# Patient Record
Sex: FEMALE | Race: BLACK OR AFRICAN AMERICAN | NOT HISPANIC OR LATINO | URBAN - METROPOLITAN AREA
[De-identification: names, ages, dates, MRNs, and addresses within clinical notes are randomized per-mention and may not be internally consistent; named-entity substitution may affect disease eponyms.]

---

## 2017-03-10 ENCOUNTER — INPATIENT (INPATIENT)
Facility: HOSPITAL | Age: 82
LOS: 5 days | Discharge: NURSING, REHAB OR LONG-TERM CARE | End: 2017-03-16
Attending: INTERNAL MEDICINE | Admitting: INTERNAL MEDICINE
Payer: MEDICARE

## 2017-03-10 VITALS
HEART RATE: 64 BPM | HEIGHT: 60 IN | WEIGHT: 110.01 LBS | TEMPERATURE: 98 F | SYSTOLIC BLOOD PRESSURE: 163 MMHG | DIASTOLIC BLOOD PRESSURE: 82 MMHG | RESPIRATION RATE: 18 BRPM | OXYGEN SATURATION: 99 %

## 2017-03-10 DIAGNOSIS — I10 ESSENTIAL (PRIMARY) HYPERTENSION: ICD-10-CM

## 2017-03-10 DIAGNOSIS — R41.82 ALTERED MENTAL STATUS, UNSPECIFIED: ICD-10-CM

## 2017-03-10 DIAGNOSIS — I21.4 NON-ST ELEVATION (NSTEMI) MYOCARDIAL INFARCTION: ICD-10-CM

## 2017-03-10 LAB
ALBUMIN SERPL ELPH-MCNC: 3.3 G/DL — SIGNIFICANT CHANGE UP (ref 3.3–5)
ALP SERPL-CCNC: 116 U/L — SIGNIFICANT CHANGE UP (ref 40–120)
ALT FLD-CCNC: 25 U/L — SIGNIFICANT CHANGE UP (ref 12–78)
AMPHET UR-MCNC: NEGATIVE — SIGNIFICANT CHANGE UP
ANION GAP SERPL CALC-SCNC: 7 MMOL/L — SIGNIFICANT CHANGE UP (ref 5–17)
APAP SERPL-MCNC: < 2 UG/ML (ref 10–30)
APPEARANCE UR: CLEAR — SIGNIFICANT CHANGE UP
APTT BLD: 35.2 SEC — SIGNIFICANT CHANGE UP (ref 27.5–37.4)
AST SERPL-CCNC: 27 U/L — SIGNIFICANT CHANGE UP (ref 15–37)
BACTERIA # UR AUTO: ABNORMAL
BARBITURATES UR SCN-MCNC: NEGATIVE — SIGNIFICANT CHANGE UP
BASOPHILS # BLD AUTO: 0.1 K/UL — SIGNIFICANT CHANGE UP (ref 0–0.2)
BASOPHILS NFR BLD AUTO: 1.9 % — SIGNIFICANT CHANGE UP (ref 0–2)
BENZODIAZ UR-MCNC: NEGATIVE — SIGNIFICANT CHANGE UP
BILIRUB SERPL-MCNC: 0.5 MG/DL — SIGNIFICANT CHANGE UP (ref 0.2–1.2)
BILIRUB UR-MCNC: NEGATIVE — SIGNIFICANT CHANGE UP
BLD GP AB SCN SERPL QL: SIGNIFICANT CHANGE UP
BUN SERPL-MCNC: 13 MG/DL — SIGNIFICANT CHANGE UP (ref 7–23)
CALCIUM SERPL-MCNC: 8.7 MG/DL — SIGNIFICANT CHANGE UP (ref 8.5–10.1)
CHLORIDE SERPL-SCNC: 105 MMOL/L — SIGNIFICANT CHANGE UP (ref 96–108)
CK SERPL-CCNC: 342 U/L — HIGH (ref 26–192)
CO2 SERPL-SCNC: 30 MMOL/L — SIGNIFICANT CHANGE UP (ref 22–31)
COCAINE METAB.OTHER UR-MCNC: NEGATIVE — SIGNIFICANT CHANGE UP
COLOR SPEC: YELLOW — SIGNIFICANT CHANGE UP
CREAT SERPL-MCNC: 0.58 MG/DL — SIGNIFICANT CHANGE UP (ref 0.5–1.3)
DIFF PNL FLD: NEGATIVE — SIGNIFICANT CHANGE UP
EOSINOPHIL # BLD AUTO: 0.1 K/UL — SIGNIFICANT CHANGE UP (ref 0–0.5)
EOSINOPHIL NFR BLD AUTO: 1.7 % — SIGNIFICANT CHANGE UP (ref 0–6)
EPI CELLS # UR: SIGNIFICANT CHANGE UP
ETHANOL SERPL-MCNC: <10 MG/DL — SIGNIFICANT CHANGE UP (ref 0–10)
GLUCOSE SERPL-MCNC: 91 MG/DL — SIGNIFICANT CHANGE UP (ref 70–99)
GLUCOSE UR QL: NEGATIVE MG/DL — SIGNIFICANT CHANGE UP
HCT VFR BLD CALC: 34.6 % — SIGNIFICANT CHANGE UP (ref 34.5–45)
HGB BLD-MCNC: 11.3 G/DL — LOW (ref 11.5–15.5)
INR BLD: 1.16 RATIO — SIGNIFICANT CHANGE UP (ref 0.88–1.16)
KETONES UR-MCNC: NEGATIVE — SIGNIFICANT CHANGE UP
LEUKOCYTE ESTERASE UR-ACNC: NEGATIVE — SIGNIFICANT CHANGE UP
LYMPHOCYTES # BLD AUTO: 1 K/UL — SIGNIFICANT CHANGE UP (ref 1–3.3)
LYMPHOCYTES # BLD AUTO: 19.3 % — SIGNIFICANT CHANGE UP (ref 13–44)
MCHC RBC-ENTMCNC: 29.2 PG — SIGNIFICANT CHANGE UP (ref 27–34)
MCHC RBC-ENTMCNC: 32.8 GM/DL — SIGNIFICANT CHANGE UP (ref 32–36)
MCV RBC AUTO: 89 FL — SIGNIFICANT CHANGE UP (ref 80–100)
METHADONE UR-MCNC: NEGATIVE — SIGNIFICANT CHANGE UP
MONOCYTES # BLD AUTO: 0.6 K/UL — SIGNIFICANT CHANGE UP (ref 0–0.9)
MONOCYTES NFR BLD AUTO: 11.3 % — SIGNIFICANT CHANGE UP (ref 2–14)
NEUTROPHILS # BLD AUTO: 3.5 K/UL — SIGNIFICANT CHANGE UP (ref 1.8–7.4)
NEUTROPHILS NFR BLD AUTO: 65.8 % — SIGNIFICANT CHANGE UP (ref 43–77)
NITRITE UR-MCNC: NEGATIVE — SIGNIFICANT CHANGE UP
OPIATES UR-MCNC: NEGATIVE — SIGNIFICANT CHANGE UP
PCP SPEC-MCNC: SIGNIFICANT CHANGE UP
PCP UR-MCNC: NEGATIVE — SIGNIFICANT CHANGE UP
PH UR: 8 — SIGNIFICANT CHANGE UP (ref 4.8–8)
PLATELET # BLD AUTO: 216 K/UL — SIGNIFICANT CHANGE UP (ref 150–400)
POTASSIUM SERPL-MCNC: 3.5 MMOL/L — SIGNIFICANT CHANGE UP (ref 3.5–5.3)
POTASSIUM SERPL-SCNC: 3.5 MMOL/L — SIGNIFICANT CHANGE UP (ref 3.5–5.3)
PROT SERPL-MCNC: 7.9 GM/DL — SIGNIFICANT CHANGE UP (ref 6–8.3)
PROT UR-MCNC: 15 MG/DL
PROTHROM AB SERPL-ACNC: 13 SEC — SIGNIFICANT CHANGE UP (ref 10–13.1)
RBC # BLD: 3.88 M/UL — SIGNIFICANT CHANGE UP (ref 3.8–5.2)
RBC # FLD: 16.3 % — HIGH (ref 11–15)
RBC CASTS # UR COMP ASSIST: SIGNIFICANT CHANGE UP /HPF (ref 0–4)
SALICYLATES SERPL-MCNC: <1.7 MG/DL — LOW (ref 2.8–20)
SODIUM SERPL-SCNC: 142 MMOL/L — SIGNIFICANT CHANGE UP (ref 135–145)
SP GR SPEC: 1.01 — SIGNIFICANT CHANGE UP (ref 1.01–1.02)
THC UR QL: NEGATIVE — SIGNIFICANT CHANGE UP
TROPONIN I SERPL-MCNC: 0.06 NG/ML — HIGH (ref 0.01–0.04)
TROPONIN I SERPL-MCNC: 0.06 NG/ML — HIGH (ref 0.01–0.04)
UROBILINOGEN FLD QL: NEGATIVE MG/DL — SIGNIFICANT CHANGE UP
WBC # BLD: 5.4 K/UL — SIGNIFICANT CHANGE UP (ref 3.8–10.5)
WBC # FLD AUTO: 5.4 K/UL — SIGNIFICANT CHANGE UP (ref 3.8–10.5)
WBC UR QL: SIGNIFICANT CHANGE UP

## 2017-03-10 PROCEDURE — 71010: CPT | Mod: 26

## 2017-03-10 PROCEDURE — 99285 EMERGENCY DEPT VISIT HI MDM: CPT

## 2017-03-10 PROCEDURE — 74177 CT ABD & PELVIS W/CONTRAST: CPT | Mod: 26

## 2017-03-10 PROCEDURE — 70450 CT HEAD/BRAIN W/O DYE: CPT | Mod: 26

## 2017-03-10 RX ORDER — CHOLECALCIFEROL (VITAMIN D3) 125 MCG
1 CAPSULE ORAL
Qty: 0 | Refills: 0 | COMMUNITY

## 2017-03-10 RX ORDER — ENOXAPARIN SODIUM 100 MG/ML
40 INJECTION SUBCUTANEOUS EVERY 24 HOURS
Qty: 0 | Refills: 0 | Status: DISCONTINUED | OUTPATIENT
Start: 2017-03-10 | End: 2017-03-16

## 2017-03-10 RX ORDER — PANTOPRAZOLE SODIUM 20 MG/1
40 TABLET, DELAYED RELEASE ORAL
Qty: 0 | Refills: 0 | Status: DISCONTINUED | OUTPATIENT
Start: 2017-03-10 | End: 2017-03-16

## 2017-03-10 RX ORDER — INFLUENZA VIRUS VACCINE 15; 15; 15; 15 UG/.5ML; UG/.5ML; UG/.5ML; UG/.5ML
0.5 SUSPENSION INTRAMUSCULAR ONCE
Qty: 0 | Refills: 0 | Status: DISCONTINUED | OUTPATIENT
Start: 2017-03-10 | End: 2017-03-16

## 2017-03-10 RX ORDER — SODIUM CHLORIDE 9 MG/ML
1000 INJECTION INTRAMUSCULAR; INTRAVENOUS; SUBCUTANEOUS ONCE
Qty: 0 | Refills: 0 | Status: COMPLETED | OUTPATIENT
Start: 2017-03-10 | End: 2017-03-10

## 2017-03-10 RX ORDER — AMLODIPINE BESYLATE 2.5 MG/1
10 TABLET ORAL DAILY
Qty: 0 | Refills: 0 | Status: DISCONTINUED | OUTPATIENT
Start: 2017-03-10 | End: 2017-03-16

## 2017-03-10 RX ORDER — METOPROLOL TARTRATE 50 MG
25 TABLET ORAL
Qty: 0 | Refills: 0 | Status: DISCONTINUED | OUTPATIENT
Start: 2017-03-10 | End: 2017-03-11

## 2017-03-10 RX ORDER — CEFTRIAXONE 500 MG/1
1 INJECTION, POWDER, FOR SOLUTION INTRAMUSCULAR; INTRAVENOUS ONCE
Qty: 0 | Refills: 0 | Status: DISCONTINUED | OUTPATIENT
Start: 2017-03-10 | End: 2017-03-10

## 2017-03-10 RX ORDER — ASPIRIN/CALCIUM CARB/MAGNESIUM 324 MG
300 TABLET ORAL ONCE
Qty: 0 | Refills: 0 | Status: COMPLETED | OUTPATIENT
Start: 2017-03-10 | End: 2017-03-10

## 2017-03-10 RX ORDER — ASPIRIN/CALCIUM CARB/MAGNESIUM 324 MG
325 TABLET ORAL ONCE
Qty: 0 | Refills: 0 | Status: COMPLETED | OUTPATIENT
Start: 2017-03-10 | End: 2017-03-10

## 2017-03-10 RX ORDER — HALOPERIDOL DECANOATE 100 MG/ML
5 INJECTION INTRAMUSCULAR ONCE
Qty: 0 | Refills: 0 | Status: COMPLETED | OUTPATIENT
Start: 2017-03-10 | End: 2017-03-10

## 2017-03-10 RX ADMIN — HALOPERIDOL DECANOATE 5 MILLIGRAM(S): 100 INJECTION INTRAMUSCULAR at 11:57

## 2017-03-10 RX ADMIN — Medication 1 MILLIGRAM(S): at 14:40

## 2017-03-10 RX ADMIN — Medication 2 MILLIGRAM(S): at 09:34

## 2017-03-10 RX ADMIN — ENOXAPARIN SODIUM 40 MILLIGRAM(S): 100 INJECTION SUBCUTANEOUS at 17:26

## 2017-03-10 RX ADMIN — Medication 300 MILLIGRAM(S): at 13:56

## 2017-03-10 RX ADMIN — SODIUM CHLORIDE 333.33 MILLILITER(S): 9 INJECTION INTRAMUSCULAR; INTRAVENOUS; SUBCUTANEOUS at 09:56

## 2017-03-10 NOTE — ED PROVIDER NOTE - PHYSICAL EXAMINATION
GEN: Alert, NAD  HEAD: atraumatic, EOMI, PERRL, normal lids/conjunctiva  ENT: normal hearing, patent oropharynx without erythema/exudate, uvula midline  NECK: +supple, no tenderness/meningismus, +Trachea midline  PULM: Bilateral BS, normal resp effort, no wheeze/stridor/retractions  CV: RRR, no M/R/G, +dist ext pulses  ABD: soft, ND, nonfocal mild pain w palpation periumb  BACK: no CVAT, no midline tenderness  MSK: no edema/erythema/cyanosis  SKIN: no rash  NEURO: Alert, oriented to place and year, self, no sensory/motor deficits, CN 2-12 intact

## 2017-03-10 NOTE — DISCHARGE NOTE ADULT - CARE PROVIDER_API CALL
Miracle Hale), Medicine  [address]  [place] [address]  Phone: [phone]  Fax: [phone]    Myron Garg), Cardiovascular Disease; Internal Medicine  [address]  [place] [address]  Phone: [phone]  Fax: [phone]    Kelli Brewer), Neurology  [address] [place] [id number]  Phone: [phone]  Fax: [phone]

## 2017-03-10 NOTE — ED PROVIDER NOTE - PRESENTING SYMPTOMS DETAILS
81F w htn, hx of mental confusion past 2 eyars by pmd, but not formally evaluated for dementai comes in w bizarre behavior at home today. pt was pacing around the house, naked warpped in a bedsheet, pulling the curtains, taking out candles wout any purpose. confusion seemed worse than usual today although now seems to be back to her baseline per daughter. was not found wandering outside. pt states she has a mild general headache, and daughter states she noted mild blood in her underwaer last night but none this morning. pt also has vague abd pain but no n/v/d. no flank pain  ROS: No fever/chills, No photophobia/eye pain/changes in vision, No ear pain/sore throat/dysphagia, No chest pain/palpitations, no SOB/cough/wheeze/stridor, No abdominal pain/N/V/D, no dysuria/frequency/discharge, No neck/back pain, no rash, no changes in neurological status/function. 81F w htn, hx of mental confusion past 2 eyars by pmd, but not formally evaluated for dementai comes in w bizarre behavior at home today. pt was pacing around the house, naked warpped in a bedsheet, pulling the curtains, taking out candles wout any purpose. confusion seemed worse than usual today although now seems to be back to her baseline per daughter. was not found wandering outside. pt states she has a mild general headache, no flank pain  ROS: No fever/chills, No photophobia/eye pain/changes in vision, No ear pain/sore throat/dysphagia, No chest pain/palpitations, no SOB/cough/wheeze/stridor, No abdominal pain/N/V/D, no dysuria/frequency/discharge, No neck/back pain, no rash, no changes in neurological status/function.

## 2017-03-10 NOTE — DISCHARGE NOTE ADULT - MEDICATION SUMMARY - MEDICATIONS TO CHANGE
I will SWITCH the dose or number of times a day I take the medications listed below when I get home from the hospital:    Norvasc 5 mg oral tablet  -- 1 tab(s) by mouth once a day

## 2017-03-10 NOTE — DISCHARGE NOTE ADULT - MEDICATION SUMMARY - MEDICATIONS TO TAKE
I will START or STAY ON the medications listed below when I get home from the hospital:    aspirin 81 mg oral tablet, chewable  -- 1 tab(s) by mouth once a day  -- Indication: For NSTEMI (non-ST elevated myocardial infarction)    isosorbide mononitrate 60 mg oral tablet, extended release  -- 1 tab(s) by mouth once a day  -- Indication: For NSTEMI (non-ST elevated myocardial infarction)    atorvastatin 40 mg oral tablet  -- 1 tab(s) by mouth once a day (at bedtime)  -- Indication: For NSTEMI (non-ST elevated myocardial infarction)    clopidogrel 75 mg oral tablet  -- 1 tab(s) by mouth once a day  -- Indication: For Stenosis of right carotid artery    risperiDONE 1 mg oral tablet  -- 1 tab(s) by mouth 2 times a day  -- Indication: For Dementia with behavioral disturbance, unspecified dementia type    metoprolol tartrate 50 mg oral tablet  -- 1 tab(s) by mouth 2 times a day  -- Indication: For Essential hypertension    amLODIPine 10 mg oral tablet  -- 1 tab(s) by mouth once a day  -- Indication: For Essential hypertension    pantoprazole 40 mg oral delayed release tablet  -- 1 tab(s) by mouth once a day (before a meal)  -- Indication: For Dementia with behavioral disturbance, unspecified dementia type    Multiple Vitamins oral tablet  -- 1 tab(s) by mouth once a day  -- Indication: For Dementia with behavioral disturbance, unspecified dementia type    Vitamin D3 50,000 intl units oral capsule  -- 1 cap(s) by mouth once a month  -- Indication: For Dementia with behavioral disturbance, unspecified dementia type

## 2017-03-10 NOTE — DISCHARGE NOTE ADULT - HOSPITAL COURSE
80yo, BF with h/o Confusion x 2yrs, HTN presents with AMS, patient naked wrapped in sheet and pacing at home. Patient notes HA and CP.  Denies SOB, cough, palpitation, n/v/d, fever, chills, weakness, abdominal pain ,dysuria.   In ER, troponin elevated 0.055.  Admitted for NSTEMI, treated with ASA, Plavix, Statin, seen by Cardiology and Neuro. Workup completed, dx with R Carotid Stenosis, Dementia and MRI showed Chronic R MCA infarction.  The patient remained stable, evaluated by PT and discharged to Rehab.

## 2017-03-10 NOTE — DISCHARGE NOTE ADULT - OTHER SIGNIFICANT FINDINGS
CT Head No Cont: EXAM:  CT BRAIN                      PROCEDURE DATE:  03/10/2017  INTERPRETATION:  INDICATION:  TIA. ConfusionTECHNIQUE:  A non contrast 2.5mm axial CT study of the brain was performed from skull base to vertex. Coronal and sagittalreformations were generated from the axial data.COMPARISON EXAMINATION:  No priorFINDINGS:  HEMISPHERES:  There are multifocal chronic ischemic changes inclusive of an old right MCA infarct, and extensive white matter disease in both hemispheres. There is associated atrophy. No acute infarct or hemorrhage is suggested that.VENTRICLES:  Midline and normal in size.POSTERIOR FOSSA:  The brain stem and cerebellum are unremarkable.  No CP angle lesion noted.EXTRACEREBRAL SPACES:  No subdural or epidural collections are noted.SKULL BASE AND CALVARIUM:  Appears intact.  No fracture or destructive lesion is identified.SINUSES AND MASTOIDS:  Clear.MISCELLANEOUS:  Extensive atherosclerotic changes are noted intracranially. IMPRESSION:  1)  multifocal chronic ischemic changes inclusive of an old right MCA infarct. No definitive acute infarct or hemorrhagic lesion.2)  no hemorrhagic lesion or mass..KASIE SONG M.D., ATTENDING RADIOLOGISTThis document has been electronically signed. Mar 10 2017  1:24PM

## 2017-03-10 NOTE — ED PROVIDER NOTE - MEDICAL DECISION MAKING DETAILS
pt given ativan/haldol for agitation and for CT which was neg. pt possibly undx dementia? pt has nstemi aalso. admitted

## 2017-03-10 NOTE — CONSULT NOTE ADULT - ASSESSMENT
consult dict lethargic arousable open eyes arm leg 4/5 dementia old r cva dementia for tsh b12 mri brain  for sub acute rehab

## 2017-03-10 NOTE — H&P ADULT. - NEGATIVE NEUROLOGICAL SYMPTOMS
no transient paralysis/no focal seizures/no tremors/no facial palsy/no generalized seizures/no weakness/no paresthesias/no loss of consciousness/no syncope/no hemiparesis/no vertigo

## 2017-03-10 NOTE — DISCHARGE NOTE ADULT - PATIENT PORTAL LINK FT
“You can access the [facility] Patient Portal, offered by [facility], by registering with the following website: [email or web address]”

## 2017-03-10 NOTE — DISCHARGE NOTE ADULT - ADDITIONAL INSTRUCTIONS
Cardiac in 1 week.  Neuro in 2 weeks.  Please follow up with your primary care physician regarding your hospitalization.

## 2017-03-10 NOTE — ED ADULT NURSE NOTE - OBJECTIVE STATEMENT
Pt is Alert & oriented x2 to self and place. Pts daughter at bedside verbalized finding pt walking in circles in her bedroom naked early this morning. According to pts daughter pt had history of periods of confusion but "confusion & restlessness was worst this morning" then shes ever observed.

## 2017-03-10 NOTE — ED ADULT NURSE REASSESSMENT NOTE - NS ED NURSE REASSESS COMMENT FT1
Pt confused & very agitated. Threatening nurses. Family tried to calm pt down. Pt calling family and staff names. MD called to bedside to reevaluate.

## 2017-03-10 NOTE — DISCHARGE NOTE ADULT - SECONDARY DIAGNOSIS.
Stenosis of right carotid artery Dementia with behavioral disturbance, unspecified dementia type Essential hypertension

## 2017-03-10 NOTE — DISCHARGE NOTE ADULT - CARE PLAN
Principal Discharge DX:	NSTEMI (non-ST elevated myocardial infarction)  Goal:	resolve  Instructions for follow-up, activity and diet:	Medication and Cardiac followup.  Secondary Diagnosis:	Stenosis of right carotid artery  Goal:	minimize  Instructions for follow-up, activity and diet:	Medication, ASA, Plavix and Statin.  Secondary Diagnosis:	Dementia with behavioral disturbance, unspecified dementia type  Goal:	minimize  Instructions for follow-up, activity and diet:	Medication and monitoring.  Secondary Diagnosis:	Essential hypertension  Goal:	normalize.  Instructions for follow-up, activity and diet:	Diet and medication.

## 2017-03-10 NOTE — ED PROVIDER NOTE - CARE PLAN
Principal Discharge DX:	NSTEMI (non-ST elevated myocardial infarction)  Secondary Diagnosis:	Confusion

## 2017-03-10 NOTE — H&P ADULT. - HISTORY OF PRESENT ILLNESS
80yo, BF with h/o Confusion x 2yrs, HTN presents with AMS, patient naked wrapped in sheet and pacing at home. Patient notes HA and CP.  Denies SOB, cough, palpitation, n/v/d, fever, chills, weakness, abdominal pain ,dysuria.   In ER, troponin elevated 0.055.

## 2017-03-10 NOTE — DISCHARGE NOTE ADULT - PLAN OF CARE
resolve Medication and Cardiac followup. minimize Medication, ASA, Plavix and Statin. Medication and monitoring. normalize. Diet and medication.

## 2017-03-10 NOTE — DISCHARGE NOTE ADULT - FINDINGS/TREATMENT
General Chemistry:    10-Mar-2017 10:02, Comprehensive Metabolic Panel  Sodium, Serum: 142, [135 - 145 mmol/L]  Potassium, Serum: 3.5, [3.5 - 5.3 mmol/L]  Chloride, Serum: 105, [96 - 108 mmol/L]  Carbon Dioxide, Serum: 30, [22 - 31 mmol/L]  Anion Gap, Serum: 7, [5 - 17 mmol/L]  Blood Urea Nitrogen, Serum: 13, [7 - 23 mg/dL]  Creatinine, Serum: 0.58, [0.50 - 1.30 mg/dL]  Glucose, Serum: 91, [70 - 99 mg/dL]  Calcium, Total Serum: 8.7, [8.5 - 10.1 mg/dL]  Protein Total, Serum: 7.9, [6.0 - 8.3 gm/dL]  Albumin, Serum: 3.3, [3.3 - 5.0 g/dL]  Bilirubin Total, Serum: 0.5, [0.2 - 1.2 mg/dL]  Alkaline Phosphatase, Serum: 116, [40 - 120 U/L] 10-Mar-2017 10:02, Complete Blood Count + Automated Diff  WBC Count: 5.4, [3.8 - 10.5 K/uL]  RBC Count: 3.88, [3.80 - 5.20 M/uL]  Hemoglobin:   11.3, [11.5 - 15.5 g/dL]  Hematocrit: 34.6, [34.5 - 45.0 %]  Mean Cell Volume: 89.0, [80.0 - 100.0 fl]  Mean Cell Hemoglobin: 29.2, [27.0 - 34.0 pg]  Mean Cell Hemoglobin Conc: 32.8, [32.0 - 36.0 gm/dL]  Red Cell Distrib Width:   16.3, [11.0 - 15.0 %]

## 2017-03-10 NOTE — DISCHARGE NOTE ADULT - NS AS DC STROKE ED MATERIALS
Stroke Warning Signs and Symptoms/Need for Followup After Discharge/Call 911 for Stroke/Risk Factors for Stroke/Prescribed Medications/Stroke Education Booklet

## 2017-03-11 DIAGNOSIS — F03.91 UNSPECIFIED DEMENTIA WITH BEHAVIORAL DISTURBANCE: ICD-10-CM

## 2017-03-11 LAB
CK SERPL-CCNC: 255 U/L — HIGH (ref 26–192)
CK SERPL-CCNC: 292 U/L — HIGH (ref 26–192)
CULTURE RESULTS: NO GROWTH — SIGNIFICANT CHANGE UP
FOLATE SERPL-MCNC: 10 NG/ML — SIGNIFICANT CHANGE UP (ref 4.8–24.2)
SPECIMEN SOURCE: SIGNIFICANT CHANGE UP
T PALLIDUM AB TITR SER: NEGATIVE — SIGNIFICANT CHANGE UP
TROPONIN I SERPL-MCNC: 0.06 NG/ML — HIGH (ref 0.01–0.04)
TROPONIN I SERPL-MCNC: 0.07 NG/ML — HIGH (ref 0.01–0.04)
VIT B12 SERPL-MCNC: 337 PG/ML — SIGNIFICANT CHANGE UP (ref 243–894)

## 2017-03-11 PROCEDURE — 93880 EXTRACRANIAL BILAT STUDY: CPT | Mod: 26

## 2017-03-11 PROCEDURE — 93306 TTE W/DOPPLER COMPLETE: CPT | Mod: 26

## 2017-03-11 RX ORDER — ISOSORBIDE MONONITRATE 60 MG/1
30 TABLET, EXTENDED RELEASE ORAL DAILY
Qty: 0 | Refills: 0 | Status: DISCONTINUED | OUTPATIENT
Start: 2017-03-11 | End: 2017-03-12

## 2017-03-11 RX ORDER — CLOPIDOGREL BISULFATE 75 MG/1
75 TABLET, FILM COATED ORAL DAILY
Qty: 0 | Refills: 0 | Status: DISCONTINUED | OUTPATIENT
Start: 2017-03-11 | End: 2017-03-16

## 2017-03-11 RX ORDER — HALOPERIDOL DECANOATE 100 MG/ML
3 INJECTION INTRAMUSCULAR ONCE
Qty: 0 | Refills: 0 | Status: COMPLETED | OUTPATIENT
Start: 2017-03-11 | End: 2017-03-11

## 2017-03-11 RX ORDER — ATORVASTATIN CALCIUM 80 MG/1
40 TABLET, FILM COATED ORAL AT BEDTIME
Qty: 0 | Refills: 0 | Status: DISCONTINUED | OUTPATIENT
Start: 2017-03-11 | End: 2017-03-16

## 2017-03-11 RX ORDER — METOPROLOL TARTRATE 50 MG
50 TABLET ORAL
Qty: 0 | Refills: 0 | Status: DISCONTINUED | OUTPATIENT
Start: 2017-03-11 | End: 2017-03-16

## 2017-03-11 RX ORDER — ASPIRIN/CALCIUM CARB/MAGNESIUM 324 MG
81 TABLET ORAL DAILY
Qty: 0 | Refills: 0 | Status: DISCONTINUED | OUTPATIENT
Start: 2017-03-11 | End: 2017-03-16

## 2017-03-11 RX ORDER — RISPERIDONE 4 MG/1
0.5 TABLET ORAL
Qty: 0 | Refills: 0 | Status: DISCONTINUED | OUTPATIENT
Start: 2017-03-11 | End: 2017-03-12

## 2017-03-11 RX ADMIN — HALOPERIDOL DECANOATE 3 MILLIGRAM(S): 100 INJECTION INTRAMUSCULAR at 22:14

## 2017-03-11 RX ADMIN — RISPERIDONE 0.5 MILLIGRAM(S): 4 TABLET ORAL at 19:39

## 2017-03-11 RX ADMIN — AMLODIPINE BESYLATE 10 MILLIGRAM(S): 2.5 TABLET ORAL at 05:32

## 2017-03-11 RX ADMIN — ISOSORBIDE MONONITRATE 30 MILLIGRAM(S): 60 TABLET, EXTENDED RELEASE ORAL at 13:53

## 2017-03-11 RX ADMIN — PANTOPRAZOLE SODIUM 40 MILLIGRAM(S): 20 TABLET, DELAYED RELEASE ORAL at 05:33

## 2017-03-11 RX ADMIN — Medication 25 MILLIGRAM(S): at 05:33

## 2017-03-11 RX ADMIN — Medication 1 TABLET(S): at 13:53

## 2017-03-11 RX ADMIN — CLOPIDOGREL BISULFATE 75 MILLIGRAM(S): 75 TABLET, FILM COATED ORAL at 13:52

## 2017-03-11 NOTE — CONSULT NOTE ADULT - SUBJECTIVE AND OBJECTIVE BOX
Patient is a 81y old  Female who presents with a chief complaint of Confusion. (10 Mar 2017 17:13)        PAST MEDICAL & SURGICAL HISTORY:  Essential hypertension      Summary of admission HPI: NSTEMI                PREVIOUS DIAGNOSTIC TESTING:      ECHO  FINDINGS:    STRESS  FINDINGS:    CATHETERIZATION  FINDINGS:    ELECTROPHYSIOLOGY STUDY  FINDINGS:    CAROTID ULTRASOUND:  FINDINGS    VENOUS DUPLEX SCAN:  FINDINGS:    CHEST CT PULMONARY ANGIO with IV Contrast:  FINDINGS:  MEDICATIONS  (STANDING):  amLODIPine   Tablet 10milliGRAM(s) Oral daily  metoprolol 25milliGRAM(s) Oral two times a day  pantoprazole    Tablet 40milliGRAM(s) Oral before breakfast  enoxaparin Injectable 40milliGRAM(s) SubCutaneous every 24 hours  multivitamin 1Tablet(s) Oral daily  influenza   Vaccine 0.5milliLiter(s) IntraMuscular once  atorvastatin 40milliGRAM(s) Oral at bedtime  clopidogrel Tablet 75milliGRAM(s) Oral daily  isosorbide   mononitrate ER Tablet (IMDUR) 30milliGRAM(s) Oral daily    MEDICATIONS  (PRN):      FAMILY HISTORY:      SOCIAL HISTORY:    CIGARETTES:    ALCOHOL:    REVIEW OF SYSTEMS:    CONSTITUTIONAL: No fever, weight loss, chills, shakes, or fatigue  EYES: No eye pain, visual disturbances, or discharge  ENMT:  No difficulty hearing, tinnitus, vertigo; No sinus or throat pain  NECK: No pain or stiffness  BREASTS: No pain, masses, or nipple discharge  RESPIRATORY: No cough, wheezing, hemoptysis, or shortness of breath  CARDIOVASCULAR: No chest pain, dyspnea, palpitations, dizziness, syncope, paroxysmal nocturnal dyspnea, orthopnea, or arm or leg swelling  GASTROINTESTINAL: No abdominal  or epigastric pain, nausea, vomiting, hematemesis, diarrhea, constipation, melena or bright red blood.  GENITOURINARY: No dysuria, nocturia, hematuria, or urinary incontinence  NEUROLOGICAL: No headaches, memory loss, slurred speech, limb weakness, loss of strength, numbness, or tremors  SKIN: No itching, burning, rashes, or lesions   LYMPH NODES: No enlarged glands  ENDOCRINE: No heat or cold intolerance, or hair loss  MUSCULOSKELETAL: No joint pain or swelling, muscle, back, or extremity pain  PSYCHIATRIC: No depression, anxiety, or difficulty sleeping  HEME/LYMPH: No easy bruising or bleeding gums  ALLERY AND IMMUNOLOGIC: No hives or rash.      Vital Signs Last 24 Hrs  T(C): 36.6, Max: 37.2 (10 @ 15:08)  T(F): 97.8, Max: 98.9 (10 @ 15:08)  HR: 61 (56 - 111)  BP: 184/83 (142/70 - 184/83)  BP(mean): --  RR: 18 (14 - 24)  SpO2: 99% (99% - 100%)    PHYSICAL EXAM:    GENERAL: In no apparent distress, well nourished, and hydrated.  HEAD:  Atraumatic, Normocephalic  EYES: EOMI, PERRLA, conjunctiva and sclera clear  ENMT: No tonsillar erythema, exudates, or enlargement; Moist mucous membranes, Good dentition, No lesions  NECK: Supple and normal thyroid.  No JVD or carotid bruit.  Carotid pulse is 2+ bilaterally.  HEART: Regular rate and rhythm; No murmurs, rubs, or gallops.  PULMONARY: Clear to auscultation and perfusion.  No rales, wheezing, or rhonchi bilaterally.  ABDOMEN: Soft, Nontender, Nondistended; Bowel sounds present  EXTREMITIES:  2+ Peripheral Pulses, No clubbing, cyanosis, or edema  LYMPH: No lymphadenopathy noted  NEUROLOGICAL: Grossly nonfocal          INTERPRETATION OF TELEMETRY:    ECG:    I&O's Detail    I & Os for current day (as of 11 Mar 2017 11:26)  =============================================  IN:    Total IN: 0 ml  ---------------------------------------------  OUT:    Voided: 400 ml    Total OUT: 400 ml  ---------------------------------------------  Total NET: -400 ml      LABS:                        11.3   5.4   )-----------( 216      ( 10 Mar 2017 10:02 )             34.6     10 Mar 2017 10:02    142    |  105    |  13     ----------------------------<  91     3.5     |  30     |  0.58     Ca    8.7        10 Mar 2017 10:02    TPro  7.9    /  Alb  3.3    /  TBili  0.5    /  DBili  x      /  AST  27     /  ALT  25     /  AlkPhos  116    10 Mar 2017 10:02    CARDIAC MARKERS ( 11 Mar 2017 07:42 )  .059 ng/mL / x     / 255 U/L / x     / x      CARDIAC MARKERS ( 10 Mar 2017 23:41 )  .066 ng/mL / x     / 292 U/L / x     / x      CARDIAC MARKERS ( 10 Mar 2017 17:07 )  .060 ng/mL / x     / 342 U/L / x     / x      CARDIAC MARKERS ( 10 Mar 2017 10:02 )  .055 ng/mL / x     / x     / x     / x          PT/INR - ( 10 Mar 2017 10:02 )   PT: 13.0 sec;   INR: 1.16 ratio         PTT - ( 10 Mar 2017 10:02 )  PTT:35.2 sec  Urinalysis Basic - ( 10 Mar 2017 10:30 )    Color: Yellow / Appearance: Clear / S.010 / pH: x  Gluc: x / Ketone: Negative  / Bili: Negative / Urobili: Negative mg/dL   Blood: x / Protein: 15 mg/dL / Nitrite: Negative   Leuk Esterase: Negative / RBC: 0-2 /HPF / WBC 0-2   Sq Epi: x / Non Sq Epi: Few / Bacteria: Many      BNP  I&O's Detail    I & Os for current day (as of 11 Mar 2017 11:26)  =============================================  IN:    Total IN: 0 ml  ---------------------------------------------  OUT:    Voided: 400 ml    Total OUT: 400 ml  ---------------------------------------------  Total NET: -400 ml    Daily Height in cm: 152.4 (10 Mar 2017 16:00)    Daily     RADIOLOGY & ADDITIONAL STUDIES:

## 2017-03-11 NOTE — PHARMACY COMMUNICATION NOTE - COMMENTS
Spoke with PA about route of administration, PA states the patient responds to IVP, and received a dose before IVP.

## 2017-03-12 DIAGNOSIS — I65.21 OCCLUSION AND STENOSIS OF RIGHT CAROTID ARTERY: ICD-10-CM

## 2017-03-12 LAB
ANION GAP SERPL CALC-SCNC: 7 MMOL/L — SIGNIFICANT CHANGE UP (ref 5–17)
BUN SERPL-MCNC: 20 MG/DL — SIGNIFICANT CHANGE UP (ref 7–23)
CALCIUM SERPL-MCNC: 8.6 MG/DL — SIGNIFICANT CHANGE UP (ref 8.5–10.1)
CHLORIDE SERPL-SCNC: 108 MMOL/L — SIGNIFICANT CHANGE UP (ref 96–108)
CO2 SERPL-SCNC: 29 MMOL/L — SIGNIFICANT CHANGE UP (ref 22–31)
CREAT SERPL-MCNC: 0.73 MG/DL — SIGNIFICANT CHANGE UP (ref 0.5–1.3)
GLUCOSE SERPL-MCNC: 108 MG/DL — HIGH (ref 70–99)
HCT VFR BLD CALC: 32.2 % — LOW (ref 34.5–45)
HGB BLD-MCNC: 10.5 G/DL — LOW (ref 11.5–15.5)
MCHC RBC-ENTMCNC: 29 PG — SIGNIFICANT CHANGE UP (ref 27–34)
MCHC RBC-ENTMCNC: 32.6 GM/DL — SIGNIFICANT CHANGE UP (ref 32–36)
MCV RBC AUTO: 89.1 FL — SIGNIFICANT CHANGE UP (ref 80–100)
PLATELET # BLD AUTO: 211 K/UL — SIGNIFICANT CHANGE UP (ref 150–400)
POTASSIUM SERPL-MCNC: 3.7 MMOL/L — SIGNIFICANT CHANGE UP (ref 3.5–5.3)
POTASSIUM SERPL-SCNC: 3.7 MMOL/L — SIGNIFICANT CHANGE UP (ref 3.5–5.3)
RBC # BLD: 3.61 M/UL — LOW (ref 3.8–5.2)
RBC # FLD: 16.7 % — HIGH (ref 11–15)
SODIUM SERPL-SCNC: 144 MMOL/L — SIGNIFICANT CHANGE UP (ref 135–145)
WBC # BLD: 3.8 K/UL — SIGNIFICANT CHANGE UP (ref 3.8–10.5)
WBC # FLD AUTO: 3.8 K/UL — SIGNIFICANT CHANGE UP (ref 3.8–10.5)

## 2017-03-12 RX ORDER — HALOPERIDOL DECANOATE 100 MG/ML
2 INJECTION INTRAMUSCULAR ONCE
Qty: 0 | Refills: 0 | Status: COMPLETED | OUTPATIENT
Start: 2017-03-12 | End: 2017-03-12

## 2017-03-12 RX ORDER — ISOSORBIDE MONONITRATE 60 MG/1
60 TABLET, EXTENDED RELEASE ORAL DAILY
Qty: 0 | Refills: 0 | Status: DISCONTINUED | OUTPATIENT
Start: 2017-03-12 | End: 2017-03-16

## 2017-03-12 RX ORDER — RISPERIDONE 4 MG/1
1 TABLET ORAL
Qty: 0 | Refills: 0 | Status: DISCONTINUED | OUTPATIENT
Start: 2017-03-12 | End: 2017-03-16

## 2017-03-12 RX ADMIN — Medication 50 MILLIGRAM(S): at 05:50

## 2017-03-12 RX ADMIN — PANTOPRAZOLE SODIUM 40 MILLIGRAM(S): 20 TABLET, DELAYED RELEASE ORAL at 05:51

## 2017-03-12 RX ADMIN — RISPERIDONE 0.5 MILLIGRAM(S): 4 TABLET ORAL at 05:51

## 2017-03-12 RX ADMIN — Medication 1 MILLIGRAM(S): at 19:56

## 2017-03-12 RX ADMIN — CLOPIDOGREL BISULFATE 75 MILLIGRAM(S): 75 TABLET, FILM COATED ORAL at 14:22

## 2017-03-12 RX ADMIN — ENOXAPARIN SODIUM 40 MILLIGRAM(S): 100 INJECTION SUBCUTANEOUS at 18:59

## 2017-03-12 RX ADMIN — ATORVASTATIN CALCIUM 40 MILLIGRAM(S): 80 TABLET, FILM COATED ORAL at 21:33

## 2017-03-12 RX ADMIN — Medication 81 MILLIGRAM(S): at 14:22

## 2017-03-12 RX ADMIN — AMLODIPINE BESYLATE 10 MILLIGRAM(S): 2.5 TABLET ORAL at 05:50

## 2017-03-12 RX ADMIN — HALOPERIDOL DECANOATE 2 MILLIGRAM(S): 100 INJECTION INTRAMUSCULAR at 19:00

## 2017-03-13 LAB
ANION GAP SERPL CALC-SCNC: 6 MMOL/L — SIGNIFICANT CHANGE UP (ref 5–17)
BUN SERPL-MCNC: 16 MG/DL — SIGNIFICANT CHANGE UP (ref 7–23)
CALCIUM SERPL-MCNC: 8.8 MG/DL — SIGNIFICANT CHANGE UP (ref 8.5–10.1)
CHLORIDE SERPL-SCNC: 105 MMOL/L — SIGNIFICANT CHANGE UP (ref 96–108)
CO2 SERPL-SCNC: 29 MMOL/L — SIGNIFICANT CHANGE UP (ref 22–31)
CREAT SERPL-MCNC: 0.59 MG/DL — SIGNIFICANT CHANGE UP (ref 0.5–1.3)
GLUCOSE SERPL-MCNC: 99 MG/DL — SIGNIFICANT CHANGE UP (ref 70–99)
POTASSIUM SERPL-MCNC: 3.7 MMOL/L — SIGNIFICANT CHANGE UP (ref 3.5–5.3)
POTASSIUM SERPL-SCNC: 3.7 MMOL/L — SIGNIFICANT CHANGE UP (ref 3.5–5.3)
SODIUM SERPL-SCNC: 140 MMOL/L — SIGNIFICANT CHANGE UP (ref 135–145)

## 2017-03-13 RX ORDER — ACETAMINOPHEN 500 MG
650 TABLET ORAL EVERY 6 HOURS
Qty: 0 | Refills: 0 | Status: DISCONTINUED | OUTPATIENT
Start: 2017-03-13 | End: 2017-03-16

## 2017-03-13 RX ADMIN — Medication 50 MILLIGRAM(S): at 18:56

## 2017-03-13 RX ADMIN — RISPERIDONE 1 MILLIGRAM(S): 4 TABLET ORAL at 05:47

## 2017-03-13 RX ADMIN — Medication 1 TABLET(S): at 12:40

## 2017-03-13 RX ADMIN — PANTOPRAZOLE SODIUM 40 MILLIGRAM(S): 20 TABLET, DELAYED RELEASE ORAL at 05:47

## 2017-03-13 RX ADMIN — Medication 1 MILLIGRAM(S): at 22:29

## 2017-03-13 RX ADMIN — Medication 650 MILLIGRAM(S): at 12:40

## 2017-03-13 RX ADMIN — ENOXAPARIN SODIUM 40 MILLIGRAM(S): 100 INJECTION SUBCUTANEOUS at 18:56

## 2017-03-13 RX ADMIN — Medication 81 MILLIGRAM(S): at 12:40

## 2017-03-13 RX ADMIN — Medication 50 MILLIGRAM(S): at 05:47

## 2017-03-13 RX ADMIN — ATORVASTATIN CALCIUM 40 MILLIGRAM(S): 80 TABLET, FILM COATED ORAL at 21:49

## 2017-03-13 RX ADMIN — ISOSORBIDE MONONITRATE 60 MILLIGRAM(S): 60 TABLET, EXTENDED RELEASE ORAL at 12:40

## 2017-03-13 RX ADMIN — AMLODIPINE BESYLATE 10 MILLIGRAM(S): 2.5 TABLET ORAL at 05:47

## 2017-03-13 RX ADMIN — Medication 650 MILLIGRAM(S): at 14:10

## 2017-03-13 NOTE — PHYSICAL THERAPY INITIAL EVALUATION ADULT - MODIFIED CLINICAL TEST OF SENSORY INTEGRATION IN BALANCE TEST
Barthel Index: Feeding Score _10__, Bathing Score _0_, Grooming Score 0__, Dressing Score _0__, Bowels Score _0__, Bladder Score _0__, Toilet Score _0__, Transfers Score _0__, Mobility Score __0_, Stairs Score _0__,     Total Score _10__

## 2017-03-14 PROCEDURE — 70551 MRI BRAIN STEM W/O DYE: CPT | Mod: 26

## 2017-03-14 RX ADMIN — Medication 1 TABLET(S): at 13:03

## 2017-03-14 RX ADMIN — ENOXAPARIN SODIUM 40 MILLIGRAM(S): 100 INJECTION SUBCUTANEOUS at 17:01

## 2017-03-14 RX ADMIN — Medication 50 MILLIGRAM(S): at 05:50

## 2017-03-14 RX ADMIN — AMLODIPINE BESYLATE 10 MILLIGRAM(S): 2.5 TABLET ORAL at 05:53

## 2017-03-14 RX ADMIN — RISPERIDONE 1 MILLIGRAM(S): 4 TABLET ORAL at 05:56

## 2017-03-14 RX ADMIN — Medication 50 MILLIGRAM(S): at 17:00

## 2017-03-14 RX ADMIN — Medication 81 MILLIGRAM(S): at 13:03

## 2017-03-14 RX ADMIN — ISOSORBIDE MONONITRATE 60 MILLIGRAM(S): 60 TABLET, EXTENDED RELEASE ORAL at 13:03

## 2017-03-14 RX ADMIN — ATORVASTATIN CALCIUM 40 MILLIGRAM(S): 80 TABLET, FILM COATED ORAL at 21:32

## 2017-03-14 RX ADMIN — CLOPIDOGREL BISULFATE 75 MILLIGRAM(S): 75 TABLET, FILM COATED ORAL at 13:03

## 2017-03-14 RX ADMIN — RISPERIDONE 1 MILLIGRAM(S): 4 TABLET ORAL at 17:01

## 2017-03-14 RX ADMIN — PANTOPRAZOLE SODIUM 40 MILLIGRAM(S): 20 TABLET, DELAYED RELEASE ORAL at 05:53

## 2017-03-15 RX ORDER — ATORVASTATIN CALCIUM 80 MG/1
1 TABLET, FILM COATED ORAL
Qty: 0 | Refills: 0 | COMMUNITY
Start: 2017-03-15

## 2017-03-15 RX ORDER — AMLODIPINE BESYLATE 2.5 MG/1
1 TABLET ORAL
Qty: 0 | Refills: 0 | COMMUNITY
Start: 2017-03-15

## 2017-03-15 RX ORDER — CLOPIDOGREL BISULFATE 75 MG/1
1 TABLET, FILM COATED ORAL
Qty: 0 | Refills: 0 | COMMUNITY
Start: 2017-03-15

## 2017-03-15 RX ORDER — METOPROLOL TARTRATE 50 MG
1 TABLET ORAL
Qty: 0 | Refills: 0 | COMMUNITY
Start: 2017-03-15

## 2017-03-15 RX ORDER — ASPIRIN/CALCIUM CARB/MAGNESIUM 324 MG
1 TABLET ORAL
Qty: 0 | Refills: 0 | COMMUNITY
Start: 2017-03-15

## 2017-03-15 RX ORDER — RISPERIDONE 4 MG/1
1 TABLET ORAL
Qty: 0 | Refills: 0 | COMMUNITY
Start: 2017-03-15

## 2017-03-15 RX ORDER — SODIUM CHLORIDE 9 MG/ML
1000 INJECTION, SOLUTION INTRAVENOUS
Qty: 0 | Refills: 0 | Status: DISCONTINUED | OUTPATIENT
Start: 2017-03-15 | End: 2017-03-16

## 2017-03-15 RX ORDER — ISOSORBIDE MONONITRATE 60 MG/1
1 TABLET, EXTENDED RELEASE ORAL
Qty: 0 | Refills: 0 | COMMUNITY
Start: 2017-03-15

## 2017-03-15 RX ORDER — PANTOPRAZOLE SODIUM 20 MG/1
1 TABLET, DELAYED RELEASE ORAL
Qty: 0 | Refills: 0 | COMMUNITY
Start: 2017-03-15

## 2017-03-15 RX ORDER — AMLODIPINE BESYLATE 2.5 MG/1
1 TABLET ORAL
Qty: 0 | Refills: 0 | COMMUNITY

## 2017-03-15 RX ADMIN — PANTOPRAZOLE SODIUM 40 MILLIGRAM(S): 20 TABLET, DELAYED RELEASE ORAL at 06:13

## 2017-03-15 RX ADMIN — ATORVASTATIN CALCIUM 40 MILLIGRAM(S): 80 TABLET, FILM COATED ORAL at 21:42

## 2017-03-15 RX ADMIN — AMLODIPINE BESYLATE 10 MILLIGRAM(S): 2.5 TABLET ORAL at 06:13

## 2017-03-15 RX ADMIN — RISPERIDONE 1 MILLIGRAM(S): 4 TABLET ORAL at 06:13

## 2017-03-15 RX ADMIN — Medication 50 MILLIGRAM(S): at 06:18

## 2017-03-15 RX ADMIN — SODIUM CHLORIDE 75 MILLILITER(S): 9 INJECTION, SOLUTION INTRAVENOUS at 21:40

## 2017-03-15 RX ADMIN — ENOXAPARIN SODIUM 40 MILLIGRAM(S): 100 INJECTION SUBCUTANEOUS at 17:36

## 2017-03-15 NOTE — PROGRESS NOTE ADULT - SUBJECTIVE AND OBJECTIVE BOX
Patient is a 81y old  Female who presents with a chief complaint of Confusion. (10 Mar 2017 17:13)        PAST MEDICAL & SURGICAL HISTORY:  Essential hypertension      Summary of admission HPI: SOB NSTEMI                PREVIOUS DIAGNOSTIC TESTING:      ECHO  FINDINGS:    STRESS  FINDINGS:    CATHETERIZATION  FINDINGS:    ELECTROPHYSIOLOGY STUDY  FINDINGS:    CAROTID ULTRASOUND:  FINDINGS    VENOUS DUPLEX SCAN:  FINDINGS:    CHEST CT PULMONARY ANGIO with IV Contrast:  FINDINGS:  MEDICATIONS  (STANDING):  amLODIPine   Tablet 10milliGRAM(s) Oral daily  pantoprazole    Tablet 40milliGRAM(s) Oral before breakfast  enoxaparin Injectable 40milliGRAM(s) SubCutaneous every 24 hours  multivitamin 1Tablet(s) Oral daily  influenza   Vaccine 0.5milliLiter(s) IntraMuscular once  atorvastatin 40milliGRAM(s) Oral at bedtime  clopidogrel Tablet 75milliGRAM(s) Oral daily  metoprolol 50milliGRAM(s) Oral two times a day  aspirin  chewable 81milliGRAM(s) Oral daily  isosorbide   mononitrate ER Tablet (IMDUR) 60milliGRAM(s) Oral daily  risperiDONE   Tablet 1milliGRAM(s) Oral two times a day    MEDICATIONS  (PRN):  acetaminophen   Tablet. 650milliGRAM(s) Oral every 6 hours PRN Mild Pain (1 - 3)      FAMILY HISTORY:      SOCIAL HISTORY:    CIGARETTES:    ALCOHOL:    REVIEW OF SYSTEMS:    CONSTITUTIONAL: No fever, weight loss, chills, shakes, or fatigue  EYES: No eye pain, visual disturbances, or discharge  ENMT:  No difficulty hearing, tinnitus, vertigo; No sinus or throat pain  NECK: No pain or stiffness  BREASTS: No pain, masses, or nipple discharge  RESPIRATORY: No cough, wheezing, hemoptysis, or shortness of breath  CARDIOVASCULAR: No chest pain, dyspnea, palpitations, dizziness, syncope, paroxysmal nocturnal dyspnea, orthopnea, or arm or leg swelling  GASTROINTESTINAL: No abdominal  or epigastric pain, nausea, vomiting, hematemesis, diarrhea, constipation, melena or bright red blood.  GENITOURINARY: No dysuria, nocturia, hematuria, or urinary incontinence  NEUROLOGICAL: No headaches, memory loss, slurred speech, limb weakness, loss of strength, numbness, or tremors  SKIN: No itching, burning, rashes, or lesions   LYMPH NODES: No enlarged glands  ENDOCRINE: No heat or cold intolerance, or hair loss  MUSCULOSKELETAL: No joint pain or swelling, muscle, back, or extremity pain  PSYCHIATRIC: No depression, anxiety, or difficulty sleeping  HEME/LYMPH: No easy bruising or bleeding gums  ALLERY AND IMMUNOLOGIC: No hives or rash.      Vital Signs Last 24 Hrs  T(C): 37.2, Max: 37.2 (03-15 @ 17:51)  T(F): 99, Max: 99 (03-15 @ 17:51)  HR: 76 (66 - 76)  BP: 108/59 (108/59 - 145/68)  BP(mean): --  RR: 16 (16 - 16)  SpO2: 96% (95% - 100%)    PHYSICAL EXAM:    GENERAL: In no apparent distress, well nourished, and hydrated.  HEAD:  Atraumatic, Normocephalic  EYES: EOMI, PERRLA, conjunctiva and sclera clear  ENMT: No tonsillar erythema, exudates, or enlargement; Moist mucous membranes, Good dentition, No lesions  NECK: Supple and normal thyroid.  No JVD or carotid bruit.  Carotid pulse is 2+ bilaterally.  HEART: Regular rate and rhythm; No murmurs, rubs, or gallops.  PULMONARY:Rhonchi  to auscultation and perfusion.  No rales, wheezing, or rhonchi bilaterally.  ABDOMEN: Soft, Nontender, Nondistended; Bowel sounds present  EXTREMITIES:  2+ Peripheral Pulses, No clubbing, cyanosis, or edema  LYMPH: No lymphadenopathy noted  NEUROLOGICAL: Grossly nonfocal          INTERPRETATION OF TELEMETRY:    ECG:    I&O's Detail    I & Os for current day (as of 15 Mar 2017 19:50)  =============================================  IN:    Oral Fluid: 240 ml    Total IN: 240 ml  ---------------------------------------------  OUT:    Total OUT: 0 ml  ---------------------------------------------  Total NET: 240 ml      LABS:                  BNP  I&O's Detail    I & Os for current day (as of 15 Mar 2017 19:50)  =============================================  IN:    Oral Fluid: 240 ml    Total IN: 240 ml  ---------------------------------------------  OUT:    Total OUT: 0 ml  ---------------------------------------------  Total NET: 240 ml    Daily     Daily     RADIOLOGY & ADDITIONAL STUDIES:
Patient is a 81y old  Female who presents with a chief complaint of Confusion. (10 Mar 2017 17:13)        PAST MEDICAL & SURGICAL HISTORY:  Essential hypertension      Summary of admission HPI: SOB NSTEMI confusion.                PREVIOUS DIAGNOSTIC TESTING:      ECHO  FINDINGS:    STRESS  FINDINGS:    CATHETERIZATION  FINDINGS:    ELECTROPHYSIOLOGY STUDY  FINDINGS:    CAROTID ULTRASOUND:  FINDINGS    VENOUS DUPLEX SCAN:  FINDINGS:    CHEST CT PULMONARY ANGIO with IV Contrast:  FINDINGS:  MEDICATIONS  (STANDING):  amLODIPine   Tablet 10milliGRAM(s) Oral daily  pantoprazole    Tablet 40milliGRAM(s) Oral before breakfast  enoxaparin Injectable 40milliGRAM(s) SubCutaneous every 24 hours  multivitamin 1Tablet(s) Oral daily  influenza   Vaccine 0.5milliLiter(s) IntraMuscular once  atorvastatin 40milliGRAM(s) Oral at bedtime  clopidogrel Tablet 75milliGRAM(s) Oral daily  metoprolol 50milliGRAM(s) Oral two times a day  aspirin  chewable 81milliGRAM(s) Oral daily  isosorbide   mononitrate ER Tablet (IMDUR) 60milliGRAM(s) Oral daily  risperiDONE   Tablet 1milliGRAM(s) Oral two times a day    MEDICATIONS  (PRN):      FAMILY HISTORY:      SOCIAL HISTORY:    CIGARETTES:    ALCOHOL:    REVIEW OF SYSTEMS:    CONSTITUTIONAL: No fever, weight loss, chills, shakes, or fatigue  EYES: No eye pain, visual disturbances, or discharge  ENMT:  No difficulty hearing, tinnitus, vertigo; No sinus or throat pain  NECK: No pain or stiffness  BREASTS: No pain, masses, or nipple discharge  RESPIRATORY: No cough, wheezing, hemoptysis, or shortness of breath  CARDIOVASCULAR: No chest pain, dyspnea, palpitations, dizziness, syncope, paroxysmal nocturnal dyspnea, orthopnea, or arm or leg swelling  GASTROINTESTINAL: No abdominal  or epigastric pain, nausea, vomiting, hematemesis, diarrhea, constipation, melena or bright red blood.  GENITOURINARY: No dysuria, nocturia, hematuria, or urinary incontinence  NEUROLOGICAL: No headaches, memory loss, slurred speech, limb weakness, loss of strength, numbness, or tremors  SKIN: No itching, burning, rashes, or lesions   LYMPH NODES: No enlarged glands  ENDOCRINE: No heat or cold intolerance, or hair loss  MUSCULOSKELETAL: No joint pain or swelling, muscle, back, or extremity pain  PSYCHIATRIC: No depression, anxiety, or difficulty sleeping  HEME/LYMPH: No easy bruising or bleeding gums  ALLERY AND IMMUNOLOGIC: No hives or rash.      Vital Signs Last 24 Hrs  T(C): 36.7, Max: 36.9 (03-12 @ 00:28)  T(F): 98, Max: 98.4 (03-12 @ 00:28)  HR: 56 (48 - 93)  BP: 139/67 (119/58 - 177/93)  BP(mean): --  RR: 17 (16 - 18)  SpO2: 100% (98% - 100%)    PHYSICAL EXAM:    GENERAL: In no apparent distress, well nourished, and hydrated.  HEAD:  Atraumatic, Normocephalic  EYES: EOMI, PERRLA, conjunctiva and sclera clear  ENMT: No tonsillar erythema, exudates, or enlargement; Moist mucous membranes, Good dentition, No lesions  NECK: Supple and normal thyroid.  No JVD or carotid bruit.  Carotid pulse is 2+ bilaterally.  HEART: Regular rate and rhythm; No murmurs, rubs, or gallops.  PULMONARY: Clear to auscultation and perfusion.  No rales, wheezing, or rhonchi bilaterally.  ABDOMEN: Soft, Nontender, Nondistended; Bowel sounds present  EXTREMITIES:  2+ Peripheral Pulses, No clubbing, cyanosis, or edema  LYMPH: No lymphadenopathy noted  NEUROLOGICAL: Grossly nonfocal          INTERPRETATION OF TELEMETRY:    ECG:    I&O's Detail      LABS:                        10.5   3.8   )-----------( 211      ( 12 Mar 2017 07:58 )             32.2     12 Mar 2017 07:58    144    |  108    |  20     ----------------------------<  108    3.7     |  29     |  0.73     Ca    8.6        12 Mar 2017 07:58      CARDIAC MARKERS ( 11 Mar 2017 07:42 )  .059 ng/mL / x     / 255 U/L / x     / x      CARDIAC MARKERS ( 10 Mar 2017 23:41 )  .066 ng/mL / x     / 292 U/L / x     / x              BNP  I&O's Detail    Daily     Daily     RADIOLOGY & ADDITIONAL STUDIES:
Patient is a 81y old  Female who presents with a chief complaint of Confusion. (10 Mar 2017 17:13)        PAST MEDICAL & SURGICAL HISTORY:  Essential hypertension      Summary of admission HPI: angina                PREVIOUS DIAGNOSTIC TESTING:      ECHO  FINDINGS:    STRESS  FINDINGS:    CATHETERIZATION  FINDINGS:    ELECTROPHYSIOLOGY STUDY  FINDINGS:    CAROTID ULTRASOUND:  FINDINGS    VENOUS DUPLEX SCAN:  FINDINGS:    CHEST CT PULMONARY ANGIO with IV Contrast:  FINDINGS:  MEDICATIONS  (STANDING):  amLODIPine   Tablet 10milliGRAM(s) Oral daily  pantoprazole    Tablet 40milliGRAM(s) Oral before breakfast  enoxaparin Injectable 40milliGRAM(s) SubCutaneous every 24 hours  multivitamin 1Tablet(s) Oral daily  influenza   Vaccine 0.5milliLiter(s) IntraMuscular once  atorvastatin 40milliGRAM(s) Oral at bedtime  clopidogrel Tablet 75milliGRAM(s) Oral daily  metoprolol 50milliGRAM(s) Oral two times a day  aspirin  chewable 81milliGRAM(s) Oral daily  isosorbide   mononitrate ER Tablet (IMDUR) 60milliGRAM(s) Oral daily  risperiDONE   Tablet 1milliGRAM(s) Oral two times a day    MEDICATIONS  (PRN):      FAMILY HISTORY:      SOCIAL HISTORY:    CIGARETTES:    ALCOHOL:    REVIEW OF SYSTEMS:    CONSTITUTIONAL: No fever, weight loss, chills, shakes, or fatigue  EYES: No eye pain, visual disturbances, or discharge  ENMT:  No difficulty hearing, tinnitus, vertigo; No sinus or throat pain  NECK: No pain or stiffness  BREASTS: No pain, masses, or nipple discharge  RESPIRATORY: No cough, wheezing, hemoptysis, or shortness of breath  CARDIOVASCULAR: No chest pain, dyspnea, palpitations, dizziness, syncope, paroxysmal nocturnal dyspnea, orthopnea, or arm or leg swelling  GASTROINTESTINAL: No abdominal  or epigastric pain, nausea, vomiting, hematemesis, diarrhea, constipation, melena or bright red blood.  GENITOURINARY: No dysuria, nocturia, hematuria, or urinary incontinence  NEUROLOGICAL: No headaches, memory loss, slurred speech, limb weakness, loss of strength, numbness, or tremors  SKIN: No itching, burning, rashes, or lesions   LYMPH NODES: No enlarged glands  ENDOCRINE: No heat or cold intolerance, or hair loss  MUSCULOSKELETAL: No joint pain or swelling, muscle, back, or extremity pain  PSYCHIATRIC: No depression, anxiety, or difficulty sleeping  HEME/LYMPH: No easy bruising or bleeding gums  ALLERY AND IMMUNOLOGIC: No hives or rash.      Vital Signs Last 24 Hrs  T(C): 36.7, Max: 36.7 (03-12 @ 17:16)  T(F): 98, Max: 98 (03-12 @ 17:16)  HR: 60 (56 - 65)  BP: 147/80 (131/55 - 147/80)  BP(mean): --  RR: 18 (16 - 18)  SpO2: 98% (98% - 100%)    PHYSICAL EXAM:    GENERAL: In no apparent distress, well nourished, and hydrated.  HEAD:  Atraumatic, Normocephalic  EYES: EOMI, PERRLA, conjunctiva and sclera clear  ENMT: No tonsillar erythema, exudates, or enlargement; Moist mucous membranes, Good dentition, No lesions  NECK: Supple and normal thyroid.  No JVD or carotid bruit.  Carotid pulse is 2+ bilaterally.  HEART: Regular rate and rhythm; No murmurs, rubs, or gallops.  PULMONARY: Clear to auscultation and perfusion.  No rales, wheezing, or rhonchi bilaterally.  ABDOMEN: Soft, Nontender, Nondistended; Bowel sounds present  EXTREMITIES:  2+ Peripheral Pulses, No clubbing, cyanosis, or edema  LYMPH: No lymphadenopathy noted  NEUROLOGICAL: Grossly nonfocal          INTERPRETATION OF TELEMETRY:    ECG:    I&O's Detail      LABS:                        10.5   3.8   )-----------( 211      ( 12 Mar 2017 07:58 )             32.2     13 Mar 2017 07:18    140    |  105    |  16     ----------------------------<  99     3.7     |  29     |  0.59     Ca    8.8        13 Mar 2017 07:18              BNP  I&O's Detail    Daily     Daily     RADIOLOGY & ADDITIONAL STUDIES:
Patient is a 81y old  Female who presents with a chief complaint of Confusion. (10 Mar 2017 17:13)      SUBJECTIVE/OBJECTIVE:    Combative this am.  Received Ativan.  Currently resting comfortably.      MEDICATIONS  (STANDING):  amLODIPine   Tablet 10milliGRAM(s) Oral daily  pantoprazole    Tablet 40milliGRAM(s) Oral before breakfast  enoxaparin Injectable 40milliGRAM(s) SubCutaneous every 24 hours  multivitamin 1Tablet(s) Oral daily  influenza   Vaccine 0.5milliLiter(s) IntraMuscular once  atorvastatin 40milliGRAM(s) Oral at bedtime  clopidogrel Tablet 75milliGRAM(s) Oral daily  metoprolol 50milliGRAM(s) Oral two times a day  aspirin  chewable 81milliGRAM(s) Oral daily  isosorbide   mononitrate ER Tablet (IMDUR) 60milliGRAM(s) Oral daily  risperiDONE   Tablet 1milliGRAM(s) Oral two times a day    MEDICATIONS  (PRN):  acetaminophen   Tablet. 650milliGRAM(s) Oral every 6 hours PRN Mild Pain (1 - 3)      Allergies    No Known Allergies    Intolerances          Vital Signs Last 24 Hrs  T(C): 37.1, Max: 37.1 (03-15 @ 05:59)  T(F): 98.8, Max: 98.8 (03-15 @ 05:59)  HR: 66 (66 - 75)  BP: 145/68 (110/68 - 145/68)  BP(mean): --  RR: 16 (16 - 16)  SpO2: 95% (95% - 100%)      PHYSICAL EXAM:  GENERAL: NAD, well-groomed, well-developed  HEAD:  Atraumatic, Normocephalic  EYES: EOMI, PERRLA, conjunctiva and sclera clear  ENMT: No tonsillar erythema, exudates, or enlargement; Moist mucous membranes, No lesions  NECK: Supple, No JVD, Normal thyroid  NERVOUS SYSTEM:  Alert and confused; Motor Strength 4/5 B/L upper and lower extremities; DTRs 2+ intact and symmetric  CHEST/LUNG: Clear bilaterally; No rales, rhonchi, wheezing, or rubs  HEART: Regular rate and rhythm; No murmurs, rubs, or gallops  ABDOMEN: Soft, Nontender, Nondistended; Bowel sounds present  EXTREMITIES:  2+ Peripheral Pulses, No clubbing, cyanosis, or edema  LYMPH: No lymphadenopathy noted  SKIN: No rashes or lesions      LABS:              CAPILLARY BLOOD GLUCOSE    Lipid panel:         RADIOLOGY & ADDITIONAL TESTS:      EXAM:  MRI BRAIN W O CONT                            PROCEDURE DATE:  03/14/2017        INTERPRETATION:  MRI brain without contrast  Comparison CT performed 4 days prior  History altered mental status    There is mild right frontal encephalomalaciaextending to the basal   ganglia system with previous MCA branch infarct. There is mild to   moderate central atrophy and moderate chronic microvascular ischemic   change in the periventricular white matter. There is no cortical edema,   mass effect or hydrocephalus. There is no evidence of acute infarct or   previous parenchymal hemorrhage. The orbital and sellar contents and   cerebellar tonsils are within normal limits    IMPRESSION:    Chronic right MCA branch infarct and white matter degeneration without   acute findings              NAIDA ISLAS M.D., ATTENDING RADIOLOGIST  This document has been electronically signed. Mar 14 2017  6:30PM              Consultant(s) Notes Reviewed:  [ ] YES  [ ] NO
Patient is a 81y old  Female who presents with a chief complaint of Confusion. (10 Mar 2017 17:13)      SUBJECTIVE/OBJECTIVE:    Resting comfortably, no agitation noted.      MEDICATIONS  (STANDING):  amLODIPine   Tablet 10milliGRAM(s) Oral daily  pantoprazole    Tablet 40milliGRAM(s) Oral before breakfast  enoxaparin Injectable 40milliGRAM(s) SubCutaneous every 24 hours  multivitamin 1Tablet(s) Oral daily  influenza   Vaccine 0.5milliLiter(s) IntraMuscular once  atorvastatin 40milliGRAM(s) Oral at bedtime  clopidogrel Tablet 75milliGRAM(s) Oral daily  metoprolol 50milliGRAM(s) Oral two times a day  aspirin  chewable 81milliGRAM(s) Oral daily  isosorbide   mononitrate ER Tablet (IMDUR) 60milliGRAM(s) Oral daily  risperiDONE   Tablet 1milliGRAM(s) Oral two times a day    MEDICATIONS  (PRN):  acetaminophen   Tablet. 650milliGRAM(s) Oral every 6 hours PRN Mild Pain (1 - 3)      Allergies    No Known Allergies    Intolerances          Vital Signs Last 24 Hrs  T(C): 36.7, Max: 36.7 (03-12 @ 17:16)  T(F): 98, Max: 98 (03-12 @ 17:16)  HR: 60 (56 - 65)  BP: 147/80 (131/55 - 147/80)  BP(mean): --  RR: 18 (16 - 18)  SpO2: 98% (98% - 100%)    PHYSICAL EXAM:  GENERAL: NAD, well-groomed, well-developed  HEAD:  Atraumatic, Normocephalic  EYES: EOMI, PERRLA, conjunctiva and sclera clear  ENMT: No tonsillar erythema, exudates, or enlargement; Moist mucous membranes, No lesions  NECK: Supple, No JVD, Normal thyroid  NERVOUS SYSTEM:  Alert and confused; Motor Strength 4/5 B/L upper and lower extremities; DTRs 2+ intact and symmetric  CHEST/LUNG: Clear bilaterally; No rales, rhonchi, wheezing, or rubs  HEART: Regular rate and rhythm; No murmurs, rubs, or gallops  ABDOMEN: Soft, Nontender, Nondistended; Bowel sounds present  EXTREMITIES:  2+ Peripheral Pulses, No clubbing, cyanosis, or edema  LYMPH: No lymphadenopathy noted  SKIN: No rashes or lesions    LABS:                        10.5   3.8   )-----------( 211      ( 12 Mar 2017 07:58 )             32.2     13 Mar 2017 07:18    140    |  105    |  16     ----------------------------<  99     3.7     |  29     |  0.59     Ca    8.8        13 Mar 2017 07:18          CAPILLARY BLOOD GLUCOSE    Lipid panel:         RADIOLOGY & ADDITIONAL TESTS:        Consultant(s) Notes Reviewed:  [ ] YES  [ ] NO
Patient is a 81y old  Female who presents with a chief complaint of Confusion. (10 Mar 2017 17:13)      SUBJECTIVE/OBJECTIVE:    Confused and comfortable.  Discussed findings with son.    MEDICATIONS  (STANDING):  amLODIPine   Tablet 10milliGRAM(s) Oral daily  pantoprazole    Tablet 40milliGRAM(s) Oral before breakfast  enoxaparin Injectable 40milliGRAM(s) SubCutaneous every 24 hours  multivitamin 1Tablet(s) Oral daily  influenza   Vaccine 0.5milliLiter(s) IntraMuscular once  atorvastatin 40milliGRAM(s) Oral at bedtime  clopidogrel Tablet 75milliGRAM(s) Oral daily  metoprolol 50milliGRAM(s) Oral two times a day  aspirin  chewable 81milliGRAM(s) Oral daily  isosorbide   mononitrate ER Tablet (IMDUR) 60milliGRAM(s) Oral daily  risperiDONE   Tablet 1milliGRAM(s) Oral two times a day    MEDICATIONS  (PRN):      Allergies    No Known Allergies    Intolerances          Vital Signs Last 24 Hrs  T(C): 36.1, Max: 36.9 (-12 @ 00:28)  T(F): 97, Max: 98.4 (03-12 @ 00:28)  HR: 48 (48 - 93)  BP: 119/58 (119/58 - 177/93)  BP(mean): --  RR: 17 (16 - 18)  SpO2: 100% (98% - 100%)    PHYSICAL EXAM:  GENERAL: NAD, well-groomed, well-developed  HEAD:  Atraumatic, Normocephalic  EYES: EOMI, PERRLA, conjunctiva and sclera clear  ENMT: No tonsillar erythema, exudates, or enlargement; Moist mucous membranes, No lesions  NECK: Supple, No JVD, Normal thyroid  NERVOUS SYSTEM:  Alert and confused; Motor Strength 5/5 B/L upper and lower extremities; DTRs 2+ intact and symmetric  CHEST/LUNG: Clear bilaterally; No rales, rhonchi, wheezing, or rubs  HEART: Regular rate and rhythm; No murmurs, rubs, or gallops  ABDOMEN: Soft, Nontender, Nondistended; Bowel sounds present  EXTREMITIES:  2+ Peripheral Pulses, No clubbing, cyanosis, or edema  LYMPH: No lymphadenopathy noted  SKIN: No rashes or lesions    LABS:                        10.5   3.8   )-----------( 211      ( 12 Mar 2017 07:58 )             32.2     12 Mar 2017 07:58    144    |  108    |  20     ----------------------------<  108    3.7     |  29     |  0.73     Ca    8.6        12 Mar 2017 07:58          CAPILLARY BLOOD GLUCOSE    Lipid panel:   CARDIAC MARKERS ( 11 Mar 2017 07:42 )  .059 ng/mL / x     / 255 U/L / x     / x      CARDIAC MARKERS ( 10 Mar 2017 23:41 )  .066 ng/mL / x     / 292 U/L / x     / x      CARDIAC MARKERS ( 10 Mar 2017 17:07 )  .060 ng/mL / x     / 342 U/L / x     / x            RADIOLOGY & ADDITIONAL TESTS:  EXAM:  US DPLX CAROTIDS COMPL BI                            PROCEDURE DATE:  2017        INTERPRETATION:      REASON FOR EXAM:  81-year-old woman altered status assess for stenosis        TECHNIQUE:   Duplex scan of the carotid arteries was performed. Spectral   Doppler analysis, in addition to color flow Doppler was performed using   gray-scale and color imaging.      COMPARISON:   None.      FINDINGS:    RIGHT CAROTID ARTERY:  There is slightly tortuous but normal visualized distal right common   carotid artery. There is moderate scattered  noncalcified and calcified   plaque. There is intimal wall thickening.      Peak systolic flow velocity (PSV) of the right common carotid artery is   53 cm/sec, and the systolic flow velocity (PSV) of the right internal   carotid artery is 169 cm/sec.  There are normal Doppler waveforms.  The   ICA/CCA ratio is 3.2. Moderate stenosis of 50-69% is suspected.    The right external carotid artery is patent.    LEFT CAROTID ARTERY:  There is slightly tortuous but patent visualized distal left common   carotid artery, without intimal thickening. There is scattered calcified    atherosclerotic plaque at the carotid bulb and origin of the left common   carotid artery without hemodynamically significant stenosis.    Peak systolic flow velocity (PSV) of the left common carotid artery is 84   cm/, and peak systolic flow velocity (PSV) of the left internal carotid   artery is 77.5 cm/sec.  The ICA/CCA ratio is 0.9.  There is a normal wave   formwith a broad systolic peak and well-maintained diastolic flow of the   left internal carotid artery.      The left external carotid artery is patent.    VERTEBRAL ARTERIES:  There is antegrade flow in the bilateral vertebral arteries.    IMPRESSION:  Moderately extensive plaque and intimal wall thickening right common   carotid artery bifurcation and origin right internal carotid artery   resulting in stenosis of 55-69%, correlation with another noninvasive   study such as CT angiography or MRA suggested for better assessment.    Scattered plaque but no significant stenosis origin left internal carotid   artery.              JARAD VARELA M.D., ATTENDING RADIOLOGIST  This document has been electronically signed. Mar 12 2017  9:00AM         EXAM:  TTE WO CON COMPLETE W DOPPL         PROCEDURE DATE:  2017        INTERPRETATION:  REPORT:    TRANSTHORACIC ECHOCARDIOGRAM REPORT           Patient Name:   SIVAKUMAR SPENCER Patient Location: [facility] Rec #:  [id number]         Accession #:      [id number]  Account #:                         Height:           60.0 in 152.4 cm  YOB: 1935          Weight:           110.2 lb 50.00 kg  Patient Age:    81 years           BSA:              1.45 m²  Patient Gender: F            BP:               179/80 mmHg        Date of Exam: 3/11/2017 10:04:01 AM  Sonographer:  SD     Procedure:     2D Echo/Doppler/Color Doppler Complete.  Indications:   Non-ST elevation (NSTEMI) myocardial infarction - I21.4  Diagnosis:     Non-ST elevation (NSTEMI) myocardial infarction - I21.4  Study Details: Technically good study.           2D AND M-MODE MEASUREMENTS (normal ranges within parentheses):  Left Ventricle:                 Normal    Aorta/Left Atrium:           Normal  IVSd (2D):              0.84 cm (0.7-1.1) Aortic Root (2D):  2.75 cm   (2.4-3.7)  LVPWd (2D):             0.93 cm (0.7-1.1) Left Atrium (2D):  3.02 cm   (1.9-4.0)  LVIDd (2D):             4.72 cm (3.4-5.7)  LVIDs (2D):             3.11 cm  LV FS (2D):          34.2 %  (>25%)  Relative Wall Thickness 0.39    (<0.42)     LV DIASTOLIC FUNCTION:  MV Peak E: 0.56 m/s Decel Time: 337 msec  MV Peak A: 1.14 m/s  E/A Ratio: 0.49     SPECTRAL DOPPLER ANALYSIS (where applicable):  Mitral Valve:  MV P1/2 Time: 97.74 msec  MV Area, PHT: 2.25 cm²     Aortic Valve: AoV Max Paul: 1.48 m/s AoV Peak P.7 mmHg AoV Mean P.6 mmHg     LVOT Vmax: 1.16 m/s LVOT VTI: 0.219 m LVOT Diameter: 2.22 cm     AoV Area, Vmax: 3.05 cm² AoV Area, VTI: 2.54 cm² AoV Area,Vmn: 2.52 cm²     Aortic Insufficiency:  AI Half-time:  806 msec  AI Decel Rate: 1.27 m/s²     Tricuspid Valve and PA/RV Systolic Pressure: TR Max Velocity: 2.53 m/s   RA Pressure: 5 mmHg RVSP/PASP: 30.6 mmHg        PHYSICIAN INTERPRETATION:  Left Ventricle: The left ventricular internal cavity size is normal.  Global LV systolic function was normal. Left ventricular ejection   fraction, by visual estimation, is 60 to 65%. Spectral Doppler shows   impaired relaxation pattern of left ventricular myocardial filling (Grade   I diastolic dysfunction).  Right Ventricle: Normal right ventricular size and function.  Left Atrium: Normal left atrial size. Mobile intra-atrial septum.  Right Atrium: The right atrium is normal in size.  Pericardium: There is no evidence of pericardial effusion.  Mitral Valve: Thickening and calcification of the anterior and posterior   mitral valve leaflets. Mitral leaflet mobility is normal. There is mild   mitral annular calcification. Trace mitral valve regurgitation is seen.  Tricuspid Valve: The tricuspid valve is normal in structure. Mild   tricuspid regurgitation is visualized.  Aortic Valve: The aortic valve is trileaflet. Sclerotic aortic valve with   normal opening. Peak transaortic gradient equals 8.7 mmHg, mean   transaortic gradient equals 5.6 mmHg, the calculated aortic valve area   equals 2.54 cm² by the continuity equation consistent with normally   opening aortic valve. Mild aortic valve regurgitation is seen.  Pulmonic Valve: The pulmonic valve is thickened with good excursion. Mild   pulmonic valve regurgitation.  Aorta: Aortic root measured at Sinus of Valsalva is normal. There is mild   aortic root calcification.  Venous: The inferior vena cava was normal sized, with respiratory size   variation greater than 50%.  Shunts: There is no evidence of a patent foramen ovale. There is no   evidence of any atrial septal defect.        Summary:   1. Left ventricular ejection fraction, by visual estimation, is 60 to   65%.   2. Technically good study.   3. Normal global left ventricular systolic function.   4. Normal left ventricular internal cavity size.   5. Spectral Doppler shows impaired relaxation pattern of left   ventricular myocardial filling (Grade I diastolic dysfunction).   6. Normal right ventricular size and function.   7. There is no evidence of pericardial effusion.   8. Mild mitral annular calcification.   9. Thickening and calcification of the anterior and posterior mitral   valve leaflets.  10. Trace mitral valve regurgitation.  11. Mild aortic regurgitation.  12. Sclerotic aortic valve with normal opening.  13. Mobile intra-atrial septum.  14. There is mild aortic root calcification.     Agus Petersen MD, FACC, FASE, FACP  Electronically signed on 3/11/2017 at 8:39:32 PM                *** Final ***    Consultant(s) Notes Reviewed:  [ xxx] YES  [ ] NO
Patient is a 81y old  Female who presents with a chief complaint of Confusion. (10 Mar 2017 17:13)      SUBJECTIVE/OBJECTIVE:    Patient confused and agitated.  Discussed findings with family at bedside.        MEDICATIONS  (STANDING):  amLODIPine   Tablet 10milliGRAM(s) Oral daily  pantoprazole    Tablet 40milliGRAM(s) Oral before breakfast  enoxaparin Injectable 40milliGRAM(s) SubCutaneous every 24 hours  multivitamin 1Tablet(s) Oral daily  influenza   Vaccine 0.5milliLiter(s) IntraMuscular once  atorvastatin 40milliGRAM(s) Oral at bedtime  clopidogrel Tablet 75milliGRAM(s) Oral daily  isosorbide   mononitrate ER Tablet (IMDUR) 30milliGRAM(s) Oral daily  metoprolol 50milliGRAM(s) Oral two times a day  risperiDONE   Tablet 0.5milliGRAM(s) Oral two times a day    MEDICATIONS  (PRN):      Allergies    No Known Allergies    Intolerances          Vital Signs Last 24 Hrs  T(C): 36.6, Max: 37.2 (-10 @ 15:08)  T(F): 97.8, Max: 98.9 (03-10 @ 15:08)  HR: 61 (56 - 75)  BP: 184/83 (161/74 - 184/83)  BP(mean): --  RR: 18 (14 - 18)  SpO2: 99% (99% - 100%)    PHYSICAL EXAM:  GENERAL:  Confused  HEAD: AT/NC  EYES: EOMI, PERRLA, conjunctiva and sclera clear  ENMT: No tonsillar erythema, exudates, or enlargement; Moist mucous membranes, No lesions  NECK: Supple, No JVD, Normal thyroid  NERVOUS SYSTEM:  Alert, Motor Strength 5/5 B/L upper and lower extremities; DTRs 2+ intact and symmetric  CHEST/LUNG: Clear bilaterally; No rales, rhonchi, wheezing, or rubs  HEART: Regular rate and rhythm; 2/5 murmur noted, rubs, or gallops  ABDOMEN: Soft, Nontender, Nondistended; Bowel sounds present  EXTREMITIES:  2+ Peripheral Pulses, No clubbing, cyanosis, or edema  LYMPH: No lymphadenopathy noted  SKIN: No rashes or lesions    LABS:                        11.3   5.4   )-----------( 216      ( 10 Mar 2017 10:02 )             34.6     10 Mar 2017 10:02    142    |  105    |  13     ----------------------------<  91     3.5     |  30     |  0.58     Ca    8.7        10 Mar 2017 10:02    TPro  7.9    /  Alb  3.3    /  TBili  0.5    /  DBili  x      /  AST  27     /  ALT  25     /  AlkPhos  116    10 Mar 2017 10:02    PT/INR - ( 10 Mar 2017 10:02 )   PT: 13.0 sec;   INR: 1.16 ratio         PTT - ( 10 Mar 2017 10:02 )  PTT:35.2 sec  Urinalysis Basic - ( 10 Mar 2017 10:30 )    Color: Yellow / Appearance: Clear / S.010 / pH: x  Gluc: x / Ketone: Negative  / Bili: Negative / Urobili: Negative mg/dL   Blood: x / Protein: 15 mg/dL / Nitrite: Negative   Leuk Esterase: Negative / RBC: 0-2 /HPF / WBC 0-2   Sq Epi: x / Non Sq Epi: Few / Bacteria: Many      CAPILLARY BLOOD GLUCOSE    Lipid panel:   CARDIAC MARKERS ( 11 Mar 2017 07:42 )  .059 ng/mL / x     / 255 U/L / x     / x      CARDIAC MARKERS ( 10 Mar 2017 23:41 )  .066 ng/mL / x     / 292 U/L / x     / x      CARDIAC MARKERS ( 10 Mar 2017 17:07 )  .060 ng/mL / x     / 342 U/L / x     / x      CARDIAC MARKERS ( 10 Mar 2017 10:02 )  .055 ng/mL / x     / x     / x     / x            RADIOLOGY & ADDITIONAL TESTS:        Consultant(s) Notes Reviewed:  [xxx ] YES  [ ] NO

## 2017-03-15 NOTE — PROGRESS NOTE ADULT - ASSESSMENT
Admitted for AMS and NSTEMI.
lethargic arousable open eyes disuss with family dementia for sub acute rehab for mri brain
pt is sleeping ct head old r cva dementia  for sub acute rehab arm leg 4/5 no seziure

## 2017-03-15 NOTE — PROGRESS NOTE ADULT - ATTENDING COMMENTS
Discussed Plan of Care with daughter, once behavior stabilized will discharge to Rehab.  Continue current care and treatment.
Seen by PT discharge planning for Rehab.  Cardiology noted medical management.  Continue current care and treatment.
Continue current care and treatment.
Continue current care and treatment.  PT consult

## 2017-03-15 NOTE — PROGRESS NOTE ADULT - PROBLEM SELECTOR PROBLEM 1
NSTEMI (non-ST elevated myocardial infarction)

## 2017-03-15 NOTE — PROGRESS NOTE ADULT - PROBLEM SELECTOR PROBLEM 2
Essential hypertension

## 2017-03-15 NOTE — PROGRESS NOTE ADULT - PROBLEM SELECTOR PLAN 1
Cardiac consult noted.  Added Imdur  ASA, Plavix Lovenox.
Cardiac consult noted.  Added Imdur  CE, TTE, EKG  ASA, Plavix Lovenox.
continue aspirin and risk stratification.

## 2017-03-15 NOTE — PROGRESS NOTE ADULT - PROVIDER SPECIALTY LIST ADULT
Cardiology
Neurology
Neurosurgery
Internal Medicine

## 2017-03-16 VITALS
TEMPERATURE: 98 F | HEART RATE: 77 BPM | SYSTOLIC BLOOD PRESSURE: 123 MMHG | RESPIRATION RATE: 15 BRPM | OXYGEN SATURATION: 96 % | DIASTOLIC BLOOD PRESSURE: 71 MMHG

## 2017-03-16 LAB
ANION GAP SERPL CALC-SCNC: 8 MMOL/L — SIGNIFICANT CHANGE UP (ref 5–17)
BUN SERPL-MCNC: 27 MG/DL — HIGH (ref 7–23)
CALCIUM SERPL-MCNC: 9.3 MG/DL — SIGNIFICANT CHANGE UP (ref 8.5–10.1)
CHLORIDE SERPL-SCNC: 113 MMOL/L — HIGH (ref 96–108)
CO2 SERPL-SCNC: 26 MMOL/L — SIGNIFICANT CHANGE UP (ref 22–31)
CREAT SERPL-MCNC: 0.77 MG/DL — SIGNIFICANT CHANGE UP (ref 0.5–1.3)
GLUCOSE SERPL-MCNC: 138 MG/DL — HIGH (ref 70–99)
HCT VFR BLD CALC: 33.8 % — LOW (ref 34.5–45)
HGB BLD-MCNC: 11.4 G/DL — LOW (ref 11.5–15.5)
MCHC RBC-ENTMCNC: 29.8 PG — SIGNIFICANT CHANGE UP (ref 27–34)
MCHC RBC-ENTMCNC: 33.8 GM/DL — SIGNIFICANT CHANGE UP (ref 32–36)
MCV RBC AUTO: 88.2 FL — SIGNIFICANT CHANGE UP (ref 80–100)
PLATELET # BLD AUTO: 266 K/UL — SIGNIFICANT CHANGE UP (ref 150–400)
POTASSIUM SERPL-MCNC: 4.2 MMOL/L — SIGNIFICANT CHANGE UP (ref 3.5–5.3)
POTASSIUM SERPL-SCNC: 4.2 MMOL/L — SIGNIFICANT CHANGE UP (ref 3.5–5.3)
RBC # BLD: 3.84 M/UL — SIGNIFICANT CHANGE UP (ref 3.8–5.2)
RBC # FLD: 16 % — HIGH (ref 11–15)
SODIUM SERPL-SCNC: 147 MMOL/L — HIGH (ref 135–145)
WBC # BLD: 9.7 K/UL — SIGNIFICANT CHANGE UP (ref 3.8–10.5)
WBC # FLD AUTO: 9.7 K/UL — SIGNIFICANT CHANGE UP (ref 3.8–10.5)

## 2017-03-16 RX ADMIN — ISOSORBIDE MONONITRATE 60 MILLIGRAM(S): 60 TABLET, EXTENDED RELEASE ORAL at 11:17

## 2017-03-16 RX ADMIN — CLOPIDOGREL BISULFATE 75 MILLIGRAM(S): 75 TABLET, FILM COATED ORAL at 11:17

## 2017-03-16 RX ADMIN — Medication 50 MILLIGRAM(S): at 06:01

## 2017-03-16 RX ADMIN — Medication 1 TABLET(S): at 11:17

## 2017-03-16 RX ADMIN — AMLODIPINE BESYLATE 10 MILLIGRAM(S): 2.5 TABLET ORAL at 06:00

## 2017-03-16 RX ADMIN — PANTOPRAZOLE SODIUM 40 MILLIGRAM(S): 20 TABLET, DELAYED RELEASE ORAL at 06:01

## 2017-03-16 RX ADMIN — Medication 81 MILLIGRAM(S): at 11:17

## 2017-03-20 DIAGNOSIS — Z86.73 PERSONAL HISTORY OF TRANSIENT ISCHEMIC ATTACK (TIA), AND CEREBRAL INFARCTION WITHOUT RESIDUAL DEFICITS: ICD-10-CM

## 2017-03-20 DIAGNOSIS — I21.4 NON-ST ELEVATION (NSTEMI) MYOCARDIAL INFARCTION: ICD-10-CM

## 2017-03-20 DIAGNOSIS — R41.82 ALTERED MENTAL STATUS, UNSPECIFIED: ICD-10-CM

## 2017-03-20 DIAGNOSIS — F03.91 UNSPECIFIED DEMENTIA WITH BEHAVIORAL DISTURBANCE: ICD-10-CM

## 2017-03-20 DIAGNOSIS — I65.21 OCCLUSION AND STENOSIS OF RIGHT CAROTID ARTERY: ICD-10-CM

## 2017-03-20 DIAGNOSIS — I10 ESSENTIAL (PRIMARY) HYPERTENSION: ICD-10-CM

## 2018-02-20 NOTE — ED PROVIDER NOTE - NS ED ATTENDING STATEMENT MOD
Attending Only No Repair - Repaired With Adjacent Surgical Defect Text (Leave Blank If You Do Not Want): After the excision the defect was repaired concurrently with another surgical defect which was in close approximation.

## 2024-01-19 NOTE — PATIENT PROFILE ADULT. - SURGICAL SITE INCISION
The left DP pulse is 1+. The right DP pulse is 2+. The PT pulses are 2+ bilaterally. The right radial pulse is 2+. The right ulnar pulse is 2+. no

## 2024-05-02 NOTE — PHYSICAL THERAPY INITIAL EVALUATION ADULT - LIGHT TOUCH SENSATION, TRUNK, REHAB EVAL
CC:  Follow-up     HPI:  The patient is a 70-year-old female with COPD, hypertension, hyperlipidemia, reflux, right hemicolectomy secondary to GI bleed following a polypectomy and history of COVID-19 who presents today for follow-up.  The patient had a chest x-ray which essentially showed no change compared to prior.  The patient is concerned about her x-ray showing some atelectasis and changes consistent with COPD.  She is currently breathing at baseline.    ROS:  Patient reports no fever chills.  No cough.  Does complain of a raspy voice.    Physical exam:   General appearance:  No acute distress  HEENT: Trachea is midline  Pulmonary:  Fair inspiratory, expiratory breath sounds are heard.  Lungs are clear to auscultation.  Cardiovascular:  S1-S2, extremities with trace edema.    GI:  Abdomen is nontender, nondistended without hepatosplenomegaly  Derm: The patient was treated for a small abscess in the right groin.  This appears improved but not resolved completely.    Assessment:  1. Skin abscess  2.  COPD     Plan:  1.  The patient is continue with mupirocin cream  2.  The patient is to continue with doxycycline twice a day for 7 more days  3.  Will schedule a follow-up with Pulmonary as well as PFT without bronchodilators.   within normal limits